# Patient Record
Sex: MALE | Race: BLACK OR AFRICAN AMERICAN | NOT HISPANIC OR LATINO | Employment: PART TIME | URBAN - METROPOLITAN AREA
[De-identification: names, ages, dates, MRNs, and addresses within clinical notes are randomized per-mention and may not be internally consistent; named-entity substitution may affect disease eponyms.]

---

## 2017-04-25 ENCOUNTER — HOSPITAL ENCOUNTER (EMERGENCY)
Facility: HOSPITAL | Age: 22
Discharge: HOME | End: 2017-04-25
Attending: EMERGENCY MEDICINE
Payer: MEDICAID

## 2017-04-25 VITALS
BODY MASS INDEX: 37.03 KG/M2 | WEIGHT: 250 LBS | HEART RATE: 77 BPM | DIASTOLIC BLOOD PRESSURE: 79 MMHG | RESPIRATION RATE: 16 BRPM | TEMPERATURE: 99 F | HEIGHT: 69 IN | OXYGEN SATURATION: 98 % | SYSTOLIC BLOOD PRESSURE: 146 MMHG

## 2017-04-25 DIAGNOSIS — M79.672 LEFT FOOT PAIN: ICD-10-CM

## 2017-04-25 PROCEDURE — 63600175 PHARM REV CODE 636 W HCPCS: Performed by: EMERGENCY MEDICINE

## 2017-04-25 PROCEDURE — 99283 EMERGENCY DEPT VISIT LOW MDM: CPT | Mod: 25

## 2017-04-25 PROCEDURE — 96372 THER/PROPH/DIAG INJ SC/IM: CPT

## 2017-04-25 RX ORDER — TRAMADOL HYDROCHLORIDE 50 MG/1
50 TABLET ORAL EVERY 6 HOURS PRN
Qty: 12 TABLET | Refills: 0 | Status: SHIPPED | OUTPATIENT
Start: 2017-04-25 | End: 2017-05-05

## 2017-04-25 RX ORDER — KETOROLAC TROMETHAMINE 30 MG/ML
30 INJECTION, SOLUTION INTRAMUSCULAR; INTRAVENOUS
Status: COMPLETED | OUTPATIENT
Start: 2017-04-25 | End: 2017-04-25

## 2017-04-25 RX ADMIN — KETOROLAC TROMETHAMINE 30 MG: 30 INJECTION, SOLUTION INTRAMUSCULAR at 06:04

## 2017-04-25 NOTE — ED PROVIDER NOTES
Encounter Date: 4/25/2017       History     Chief Complaint   Patient presents with    Foot Pain     began last night; denies known injury; took tylenol 0500 with no relief; no swelling, palpable pulse; weight bearing as tolerated; FROM     Review of patient's allergies indicates:  No Known Allergies  HPI Comments: 22-year-old male presents the emergency department complaining of left foot pain.  Reports it is somewhat worse today.  Reports an aching pain to the left foot that is worse with ambulation.  Denies any injury.  Denies any alleviating factors.  Denies any other symptoms.    The history is provided by the patient.     History reviewed. No pertinent past medical history.  History reviewed. No pertinent surgical history.  History reviewed. No pertinent family history.  Social History   Substance Use Topics    Smoking status: Never Smoker    Smokeless tobacco: None    Alcohol use Yes     Review of Systems   Constitutional: Negative for chills, fatigue and fever.   HENT: Negative for congestion, rhinorrhea, sore throat and voice change.    Eyes: Negative for photophobia, pain and redness.   Respiratory: Negative for cough, choking and shortness of breath.    Cardiovascular: Negative for chest pain, palpitations and leg swelling.   Gastrointestinal: Negative for abdominal pain, constipation, diarrhea, nausea and vomiting.   Genitourinary: Negative for dysuria, frequency and urgency.   Musculoskeletal: Negative for back pain, neck pain and neck stiffness.   Neurological: Negative for seizures, speech difficulty, light-headedness, numbness and headaches.   All other systems reviewed and are negative.      Physical Exam   Initial Vitals   BP Pulse Resp Temp SpO2   04/25/17 1733 04/25/17 1733 04/25/17 1733 04/25/17 1733 04/25/17 1733   146/79 77 16 98.8 °F (37.1 °C) 98 %     Physical Exam    Nursing note and vitals reviewed.  Constitutional: He appears well-developed and well-nourished. No distress.   HENT:    Head: Normocephalic and atraumatic.   Mouth/Throat: Oropharynx is clear and moist.   Eyes: Conjunctivae and EOM are normal. Pupils are equal, round, and reactive to light.   Neck: Normal range of motion. Neck supple. No tracheal deviation present.   Cardiovascular: Normal rate, regular rhythm, normal heart sounds and intact distal pulses.   Pulmonary/Chest: Breath sounds normal. No respiratory distress. He has no wheezes. He has no rhonchi. He has no rales.   Abdominal: Soft. Bowel sounds are normal. He exhibits no distension. There is no tenderness. There is no rebound and no guarding.   Musculoskeletal: Normal range of motion. He exhibits tenderness (Minimal tenderness to the left lateral foot; no swelling, erythema, induration, deformity noted). He exhibits no edema.   Neurological: He is alert and oriented to person, place, and time. He has normal strength. No cranial nerve deficit or sensory deficit.   Skin: Skin is warm and dry.         ED Course   Procedures  Labs Reviewed - No data to display       X-Rays:   Independently Interpreted Readings:   Other Readings:  X-ray left foot interpreted by radiologist and visualized by me:    Imaging Results         X-Ray Foot Complete Left (Final result) Result time:  04/25/17 18:34:08    Final result by Osvaldo Perez III, MD (04/25/17 18:34:08)    Narrative:    3 views: There is a hallux valgus deformity.  No fracture dislocation bone destruction seen.      Electronically signed by: OSVALDO PEREZ  Date:     04/25/17  Time:    18:34               Medical Decision Making:   Initial Assessment:   22-year-old male presents the emergency department complaining of nontraumatic left foot pain  Differential Diagnosis:   Infection, gout, arthritis  Independently Interpreted Test(s):   I have ordered and independently interpreted X-rays - see prior notes.  ED Management:  Patient feeling somewhat better after Toradol IM.  Discussed disposition including discharge with  instructions to arrange for follow-up appointment with a primary care physician, return with any new or worsening symptoms.  Patient given Adelita for tramadol.  Patient expressed good understanding and is comfortable discharge at this time.                   ED Course     Clinical Impression:   The encounter diagnosis was Left foot pain.    Disposition:   Disposition: Discharged  Condition: Stable       Chandra Youssef MD  04/25/17 2268

## 2017-04-25 NOTE — ED AVS SNAPSHOT
[facility]  [address] [place] [address]               Gustavo Young   2017  5:48 PM   ED    Description:  Male : 1995   Department:  [facility]           Your Care was Coordinated By:     Provider Role From To    Chandra Youssef MD Attending Provider [id number] --      Reason for Visit     Foot Pain           Diagnoses this Visit        Comments    Left foot pain           ED Disposition     None           To Do List           Follow-up Information     Schedule an appointment as soon as possible for a visit with [facility].    Specialty:  Family Medicine    Contact information:    [address], [address]  [facility] [address] [phone]       These Medications        Disp Refills Start End    tramadol (ULTRAM) 50 mg tablet 12 tablet 0 2017    Take 1 tablet (50 mg total) by mouth every 6 (six) hours as needed for Pain. - Oral      [facility]     [facility] Care Line -  Assistance  Unless otherwise directed by your provider, please contact [facility], our nurse care line that is available for  assistance.     Registered nurses in the [facility] provide: appointment scheduling, clinical advisement, health education, and other advisory services.  Call: [phone] (toll free)               Medications           Message regarding Medications     Verify the changes and/or additions to your medication regime listed below are the same as discussed with your clinician today.  If any of these changes or additions are incorrect, please notify your healthcare provider.        START taking these NEW medications        Refills    tramadol (ULTRAM) 50 mg tablet 0    Sig: Take 1 tablet (50 mg total) by mouth every 6 (six) hours as needed for Pain.    Class: Print    Route: Oral      These medications were administered today        Dose Freq  "   ketorolac injection 30 mg 30 mg ED 1 Time    Sig: Inject 30 mg into the muscle ED 1 Time.    Class: Normal    Route: Intramuscular           Verify that the below list of medications is an accurate representation of the medications you are currently taking.  If none reported, the list may be blank. If incorrect, please contact your healthcare provider. Carry this list with you in case of emergency.           Current Medications     tramadol (ULTRAM) 50 mg tablet Take 1 tablet (50 mg total) by mouth every 6 (six) hours as needed for Pain.           Clinical Reference Information           Your Vitals Were     BP Pulse Temp Resp Height Weight    146/79 (BP Location: Right arm, Patient Position: Sitting) 77 98.8 °F (37.1 °C) (Oral) 16 5' 9" (1.753 m) 113.4 kg (250 lb)    SpO2 BMI             98% 36.92 kg/m2         Allergies as of 4/25/2017     No Known Allergies      Immunizations Administered on Date of Encounter - 4/25/2017     None      ED Micro, Lab, POCT     None      ED Imaging Orders     Start Ordered       Status Ordering Provider    04/25/17 1758 04/25/17 1758  X-Ray Foot Complete Left  1 time imaging      Final result       Discharge References/Attachments     R.I.C.E. (ENGLISH)    SHOES, WEARING PROPER (ENGLISH)    PLANTAR FASCIITIS, UNDERSTANDING (ENGLISH)      [facility] Sign-Up     Activating your [facility] account is as easy as 1-2-3!     1) Visit [email or web address], select Sign Up Now, enter this activation code and your date of birth, then select Next.  [email or web address]  Expires: 6/9/2017  6:39 PM      2) Create a username and password to use when you visit [facility] in the future and select a security question in case you lose your password and select Next.    3) Enter your e-mail address and click Sign Up!    Additional Information  If you have questions, please e-mail [email or web address] or call [phone] to talk to our [facility] staff. Remember, [facility] is NOT to be used for urgent needs. " For medical emergencies, dial 911.          [facility] complies with applicable Federal civil rights laws and does not discriminate on the basis of race, color, national origin, age, disability, or sex.        Language Assistance Services     ATTENTION: Language assistance services are available, free of charge. Please call [phone].      ATENCIÓN: Si habla español, tiene a torres disposición servicios gratuitos de asistencia lingüística. Llame al [phone].     CHÚ Ý: N?u b?n nói Ti?ng Vi?t, có các d?ch v? h? tr? ngôn ng? mi?n phí dành cho b?n. G?i s? [phone].

## 2018-06-20 ENCOUNTER — HOSPITAL ENCOUNTER (EMERGENCY)
Facility: HOSPITAL | Age: 23
Discharge: HOME | End: 2018-06-20
Attending: EMERGENCY MEDICINE
Payer: MEDICAID

## 2018-06-20 VITALS
SYSTOLIC BLOOD PRESSURE: 147 MMHG | WEIGHT: 256 LBS | BODY MASS INDEX: 38.8 KG/M2 | HEART RATE: 79 BPM | TEMPERATURE: 99 F | RESPIRATION RATE: 18 BRPM | DIASTOLIC BLOOD PRESSURE: 72 MMHG | HEIGHT: 68 IN | OXYGEN SATURATION: 97 %

## 2018-06-20 DIAGNOSIS — K60.2 ANAL FISSURE: Primary | ICD-10-CM

## 2018-06-20 PROCEDURE — 99283 EMERGENCY DEPT VISIT LOW MDM: CPT

## 2018-06-20 RX ORDER — POLYETHYLENE GLYCOL 3350 17 G/17G
17 POWDER, FOR SOLUTION ORAL DAILY
Qty: 1 BOTTLE | Refills: 0 | Status: SHIPPED | OUTPATIENT
Start: 2018-06-20

## 2018-06-20 RX ORDER — LACTULOSE 10 G/15ML
20 SOLUTION ORAL EVERY 6 HOURS PRN
Qty: 600 ML | Refills: 0 | Status: SHIPPED | OUTPATIENT
Start: 2018-06-20 | End: 2018-06-30

## 2018-06-20 NOTE — ED PROVIDER NOTES
Encounter Date: 6/20/2018    SCRIBE #1 NOTE: I, Cris Rodriguez, am scribing for, and in the presence of,  Dr. Orozco. I have scribed the entire note.       History     Chief Complaint   Patient presents with    Rectal Pain     c/o rectal pain and bleeding from rectum x4 days. Also has been contipated, and last normal bm was 4 days ago     Gustavo Young is a 23 y.o. male who  has no past medical history on file.    The patient presents to the ED due to rectal pain. The patient reports onset of symptoms was about 4 days ago. The pain is worse with sitting. The patient's last bowel movement was 4 days ago. The patient has associated rectal bleeding but abdominal pain, nausea or vomiting. The patient denies use of any medication for the symptoms. The patient admits to history of constipation due to pylori.       The history is provided by the patient.     Review of patient's allergies indicates:  No Known Allergies  History reviewed. No pertinent past medical history.  History reviewed. No pertinent surgical history.  No family history on file.  Social History   Substance Use Topics    Smoking status: Current Some Day Smoker    Smokeless tobacco: Not on file    Alcohol use Yes      Comment: socially     Review of Systems   Gastrointestinal: Positive for anal bleeding, constipation and rectal pain. Negative for abdominal pain, nausea and vomiting.   All other systems reviewed and are negative.      Physical Exam     Initial Vitals [06/20/18 1834]   BP Pulse Resp Temp SpO2   (!) 147/72 79 18 99 °F (37.2 °C) 97 %      MAP       --         Physical Exam    Nursing note and vitals reviewed.  Constitutional: He appears well-developed and well-nourished. He is not diaphoretic. No distress.   HENT:   Head: Normocephalic and atraumatic.   Eyes: Conjunctivae and EOM are normal.   Neck: Normal range of motion. Neck supple.   Cardiovascular: Intact distal pulses.   Pulmonary/Chest: No respiratory distress.    Genitourinary:   Genitourinary Comments: Numerous anal warts. Small external hemorrhoid.    Musculoskeletal: Normal range of motion. He exhibits no edema or tenderness.   Lymphadenopathy:     He has no cervical adenopathy.   Neurological: He is alert and oriented to person, place, and time. He has normal strength.   Skin: Skin is warm and dry. No rash noted.         ED Course   Procedures  Labs Reviewed - No data to display       Imaging Results    None          Medical Decision Making:   ED Management:  Pt likely has anal fissure vs internal hemorrhoid.  I will dc w stool softener and f/u w gi              Attending Attestation:           Physician Attestation for Scribe:  Physician Attestation Statement for Scribe #1: I, Axel Orozco, reviewed documentation, as scribed by Cris Rodriguez in my presence, and it is both accurate and complete.                    Clinical Impression:     1. Anal fissure        Disposition:   Disposition: Discharged  Condition: Stable                        Axel Orozco MD  06/20/18 4164

## 2023-10-17 ENCOUNTER — TELEPHONE (OUTPATIENT)
Dept: ENDOCRINOLOGY | Facility: CLINIC | Age: 28
End: 2023-10-17
Payer: MEDICAID

## 2023-10-17 NOTE — TELEPHONE ENCOUNTER
----- Message from Nallely Kaufman MA sent at 10/17/2023  2:42 PM CDT -----  Regarding: FW: Endo Referral  Dr Piedra can you please let me know if this patient can be scheduled in pituitary clinic thank you   ----- Message -----  From: Becky Fernandes  Sent: 10/16/2023  12:50 PM CDT  To: Nallely Kaufman MA  Subject: FW: Endo Referral                                Good afternoon,  Just wanted to follow up on this referral. Please let me know if/when patient will be scheduled. If no appointments are available at this itme, please advise and I will let the referring clinic know.  Thank you :)  Gonzalo  ----- Message -----  From: Becky Fernandes  Sent: 10/9/2023  10:52 AM CDT  To: [facility] Staff  Subject: Endo Referral                                    Good morning,  Tuyet Cary would like to refer the patient to the Endocrinology department. The patients diagnosis is Pituitary microadenoma [D35.2].     Due to the diagnosis, the decision tree advised an inbasket be sent.     Please review and contact patient to schedule appointment.     Thank you,   Becky Richard

## 2024-04-02 ENCOUNTER — LAB VISIT (OUTPATIENT)
Dept: LAB | Facility: HOSPITAL | Age: 29
End: 2024-04-02
Attending: INTERNAL MEDICINE
Payer: MEDICAID

## 2024-04-02 ENCOUNTER — OFFICE VISIT (OUTPATIENT)
Dept: ENDOCRINOLOGY | Facility: CLINIC | Age: 29
End: 2024-04-02
Payer: MEDICAID

## 2024-04-02 VITALS
SYSTOLIC BLOOD PRESSURE: 116 MMHG | HEIGHT: 69 IN | WEIGHT: 219.44 LBS | DIASTOLIC BLOOD PRESSURE: 70 MMHG | BODY MASS INDEX: 32.5 KG/M2

## 2024-04-02 DIAGNOSIS — D35.2 PROLACTINOMA: ICD-10-CM

## 2024-04-02 DIAGNOSIS — D35.2 PROLACTINOMA: Primary | ICD-10-CM

## 2024-04-02 DIAGNOSIS — F64.0 TRANSGENDER WOMAN ON HORMONE THERAPY: ICD-10-CM

## 2024-04-02 DIAGNOSIS — Z79.899 TRANSGENDER WOMAN ON HORMONE THERAPY: ICD-10-CM

## 2024-04-02 LAB
ALBUMIN SERPL BCP-MCNC: 4 G/DL (ref 3.5–5.2)
ALP SERPL-CCNC: 124 U/L (ref 55–135)
ALT SERPL W/O P-5'-P-CCNC: 17 U/L (ref 10–44)
ANION GAP SERPL CALC-SCNC: 6 MMOL/L (ref 8–16)
AST SERPL-CCNC: 15 U/L (ref 10–40)
BILIRUB SERPL-MCNC: 0.2 MG/DL (ref 0.1–1)
BUN SERPL-MCNC: 14 MG/DL (ref 6–20)
CALCIUM SERPL-MCNC: 9.1 MG/DL (ref 8.7–10.5)
CHLORIDE SERPL-SCNC: 109 MMOL/L (ref 95–110)
CO2 SERPL-SCNC: 24 MMOL/L (ref 23–29)
CREAT SERPL-MCNC: 0.8 MG/DL (ref 0.5–1.4)
EST. GFR  (NO RACE VARIABLE): >60 ML/MIN/1.73 M^2
ESTIMATED AVG GLUCOSE: 103 MG/DL (ref 68–131)
ESTRADIOL SERPL-MCNC: 45 PG/ML (ref 11–44)
FSH SERPL-ACNC: 7.85 MIU/ML (ref 0.95–11.95)
GLUCOSE SERPL-MCNC: 86 MG/DL (ref 70–110)
HBA1C MFR BLD: 5.2 % (ref 4–5.6)
LH SERPL-ACNC: 7.9 MIU/ML (ref 0.6–12.1)
POTASSIUM SERPL-SCNC: 4.6 MMOL/L (ref 3.5–5.1)
PROLACTIN SERPL IA-MCNC: 13.1 NG/ML (ref 3.5–19.4)
PROT SERPL-MCNC: 6.7 G/DL (ref 6–8.4)
SODIUM SERPL-SCNC: 139 MMOL/L (ref 136–145)
T4 FREE SERPL-MCNC: 0.78 NG/DL (ref 0.71–1.51)
TESTOST SERPL-MCNC: 912 NG/DL (ref 304–1227)
TSH SERPL DL<=0.005 MIU/L-ACNC: 0.8 UIU/ML (ref 0.4–4)

## 2024-04-02 PROCEDURE — 3074F SYST BP LT 130 MM HG: CPT | Mod: CPTII,,, | Performed by: INTERNAL MEDICINE

## 2024-04-02 PROCEDURE — 84439 ASSAY OF FREE THYROXINE: CPT | Performed by: INTERNAL MEDICINE

## 2024-04-02 PROCEDURE — 3008F BODY MASS INDEX DOCD: CPT | Mod: CPTII,,, | Performed by: INTERNAL MEDICINE

## 2024-04-02 PROCEDURE — 83002 ASSAY OF GONADOTROPIN (LH): CPT | Performed by: INTERNAL MEDICINE

## 2024-04-02 PROCEDURE — 99213 OFFICE O/P EST LOW 20 MIN: CPT | Mod: PBBFAC | Performed by: INTERNAL MEDICINE

## 2024-04-02 PROCEDURE — 99999 PR PBB SHADOW E&M-EST. PATIENT-LVL III: CPT | Mod: PBBFAC,,, | Performed by: INTERNAL MEDICINE

## 2024-04-02 PROCEDURE — 82670 ASSAY OF TOTAL ESTRADIOL: CPT | Performed by: INTERNAL MEDICINE

## 2024-04-02 PROCEDURE — 3078F DIAST BP <80 MM HG: CPT | Mod: CPTII,,, | Performed by: INTERNAL MEDICINE

## 2024-04-02 PROCEDURE — 84443 ASSAY THYROID STIM HORMONE: CPT | Performed by: INTERNAL MEDICINE

## 2024-04-02 PROCEDURE — 83036 HEMOGLOBIN GLYCOSYLATED A1C: CPT | Performed by: INTERNAL MEDICINE

## 2024-04-02 PROCEDURE — 1160F RVW MEDS BY RX/DR IN RCRD: CPT | Mod: CPTII,,, | Performed by: INTERNAL MEDICINE

## 2024-04-02 PROCEDURE — 1159F MED LIST DOCD IN RCRD: CPT | Mod: CPTII,,, | Performed by: INTERNAL MEDICINE

## 2024-04-02 PROCEDURE — 84146 ASSAY OF PROLACTIN: CPT | Performed by: INTERNAL MEDICINE

## 2024-04-02 PROCEDURE — 3044F HG A1C LEVEL LT 7.0%: CPT | Mod: CPTII,,, | Performed by: INTERNAL MEDICINE

## 2024-04-02 PROCEDURE — 99204 OFFICE O/P NEW MOD 45 MIN: CPT | Mod: S$PBB,,, | Performed by: INTERNAL MEDICINE

## 2024-04-02 PROCEDURE — 83001 ASSAY OF GONADOTROPIN (FSH): CPT | Performed by: INTERNAL MEDICINE

## 2024-04-02 PROCEDURE — 84403 ASSAY OF TOTAL TESTOSTERONE: CPT | Performed by: INTERNAL MEDICINE

## 2024-04-02 PROCEDURE — 80053 COMPREHEN METABOLIC PANEL: CPT | Performed by: INTERNAL MEDICINE

## 2024-04-02 PROCEDURE — 84305 ASSAY OF SOMATOMEDIN: CPT | Performed by: INTERNAL MEDICINE

## 2024-04-02 NOTE — ASSESSMENT & PLAN NOTE
reviewed indications for therapy including macroadenoma, galactorrhea, infertility and hypogonadism which can negatively impact bone health.      Repeat labs noting that ert and THC can increase prolactin levels     recheck MRI  later in the year

## 2024-04-02 NOTE — PROGRESS NOTES
"Subjective:      Patient ID: Gustavo Young is a 29 y.o.    Chief Complaint:  pituitary  MRI     History of Present Illness     She gets her gender care at [facility]   Started in 2019  Pills--> injections -- off for many months -- when she was using ert she often used more than prescribed   On verónica 3 pills a day 100 mg per pill - she thinks     Interested in BA     She has noticed bilat galactorrhea - she started ert in 2019   Her prolactin was mildly  elevated and this prompted a MRI   +THC use     Mri 7/18/23        IMPRESSION:   4 mm pituitary microadenoma.        With regards to obesity, Body mass index is 32.41 kg/m².,     Denies symptoms of hyperglycemia such as polyuria, polydipsia, nocturia, unexplained weight loss or blurred vision    She is not  concerned about her weight     No change in ring size or show size   No teeth separation     No formal MH diagnosis  No on any mental enoc meds          ROS:   As above    Objective:     /70   Ht 5' 9" (1.753 m)   Wt 99.6 kg (219 lb 7.5 oz)   BMI 32.41 kg/m²     Body mass index is 32.41 kg/m².      Physical Exam  Vitals reviewed.   Constitutional:       Appearance: Normal appearance.   Neck:      Comments: No goiter   Cardiovascular:      Rate and Rhythm: Normal rate.   Pulmonary:      Effort: Pulmonary effort is normal.   Abdominal:      Palpations: Abdomen is soft.   Musculoskeletal:      Right lower leg: No edema.      Left lower leg: No edema.   Psychiatric:         Mood and Affect: Mood normal.                Lab Review:   Lab Results   Component Value Date    HGBA1C 5.2 04/02/2024     Lab Results   Component Value Date    CHOL 117 (L) 01/20/2011    HDL 22 (L) 01/20/2011    LDLCALC 81.0 01/20/2011    TRIG 70 01/20/2011    CHOLHDL 18.8 (L) 01/20/2011     Lab Results   Component Value Date     04/02/2024    K 4.6 04/02/2024     04/02/2024    CO2 24 04/02/2024    GLU 86 04/02/2024    BUN 14 04/02/2024    CREATININE 0.8 04/02/2024    " "CALCIUM 9.1 04/02/2024    PROT 6.7 04/02/2024    ALBUMIN 4.0 04/02/2024    BILITOT 0.2 04/02/2024    ALKPHOS 124 04/02/2024    AST 15 04/02/2024    ALT 17 04/02/2024    ANIONGAP 6 (L) 04/02/2024    ESTGFRAFRICA >60 01/20/2011    EGFRNONAA >60 01/20/2011     No results found for: "JGRKRNNO72TM"  Lab Results   Component Value Date    WBC 6.7 01/20/2011    HGB 14.6 01/20/2011    HCT 45.2 01/20/2011    MCV 85.3 01/20/2011     01/20/2011           Assessment and Plan     Prolactinoma  reviewed indications for therapy including macroadenoma, galactorrhea, infertility and hypogonadism which can negatively impact bone health.      Repeat labs noting that ert and THC can increase prolactin levels     recheck MRI  later in the year        Transgender woman on hormone therapy  Reviewed ert can increase prolactin levels    Urged her not to exceed her prescribed doses      BMI 32.0-32.9,adult  Check hba1c          "

## 2024-04-08 LAB
IGF-I SERPL-MCNC: 134 NG/ML (ref 60–329)
IGF-I Z-SCORE SERPL: -0.52 SD

## 2025-02-21 ENCOUNTER — OCCUPATIONAL HEALTH (OUTPATIENT)
Dept: URGENT CARE | Facility: CLINIC | Age: 30
End: 2025-02-21

## 2025-02-21 DIAGNOSIS — Z13.9 ENCOUNTER FOR SCREENING: Primary | ICD-10-CM

## 2025-02-21 RX ORDER — CABOTEGRAVIR AND RILPIVIRINE 600-900/3
KIT INTRAMUSCULAR
COMMUNITY
Start: 2025-01-14

## 2025-02-24 ENCOUNTER — OCCUPATIONAL HEALTH (OUTPATIENT)
Dept: URGENT CARE | Facility: CLINIC | Age: 30
End: 2025-02-24

## 2025-02-24 DIAGNOSIS — Z13.9 ENCOUNTER FOR SCREENING: Primary | ICD-10-CM
